# Patient Record
Sex: MALE | Race: OTHER | HISPANIC OR LATINO
[De-identification: names, ages, dates, MRNs, and addresses within clinical notes are randomized per-mention and may not be internally consistent; named-entity substitution may affect disease eponyms.]

---

## 2017-05-02 ENCOUNTER — RX ONLY (OUTPATIENT)
Age: 32
Setting detail: RX ONLY
End: 2017-05-02

## 2018-04-18 ENCOUNTER — APPOINTMENT (RX ONLY)
Dept: URBAN - METROPOLITAN AREA CLINIC 20 | Facility: CLINIC | Age: 33
Setting detail: DERMATOLOGY
End: 2018-04-18

## 2018-04-18 DIAGNOSIS — L80 VITILIGO: ICD-10-CM

## 2018-04-18 PROCEDURE — ? COUNSELING

## 2018-04-18 PROCEDURE — 99202 OFFICE O/P NEW SF 15 MIN: CPT

## 2018-04-18 PROCEDURE — ? PRESCRIPTION

## 2018-04-18 PROCEDURE — ? ORDER TESTS

## 2018-04-18 PROCEDURE — ? ADDITIONAL NOTES

## 2018-04-18 RX ORDER — TACROLIMUS 1 MG/G
OINTMENT TOPICAL BID
Qty: 1 | Refills: 3 | Status: ERX | COMMUNITY
Start: 2018-04-18

## 2018-04-18 RX ORDER — TRIAMCINOLONE ACETONIDE 1 MG/G
CREAM TOPICAL BID
Qty: 1 | Refills: 3 | Status: ERX | COMMUNITY
Start: 2018-04-18

## 2018-04-18 RX ADMIN — TACROLIMUS: 1 OINTMENT TOPICAL at 00:00

## 2018-04-18 RX ADMIN — TRIAMCINOLONE ACETONIDE: 1 CREAM TOPICAL at 00:00

## 2018-04-18 ASSESSMENT — LOCATION DETAILED DESCRIPTION DERM
LOCATION DETAILED: RIGHT INDEX DISTAL INTERPHALANGEAL JOINT
LOCATION DETAILED: RIGHT UPPER CUTANEOUS LIP
LOCATION DETAILED: LEFT INFERIOR VERMILION LIP
LOCATION DETAILED: LEFT DISTAL RADIAL DORSAL INDEX FINGER

## 2018-04-18 ASSESSMENT — LOCATION ZONE DERM
LOCATION ZONE: FINGER
LOCATION ZONE: LIP

## 2018-04-18 ASSESSMENT — LOCATION SIMPLE DESCRIPTION DERM
LOCATION SIMPLE: LEFT LIP
LOCATION SIMPLE: RIGHT INDEX FINGER
LOCATION SIMPLE: RIGHT LIP
LOCATION SIMPLE: LEFT INDEX FINGER

## 2018-04-18 NOTE — HPI: DISCOLORATION
How Severe Is Your Skin Discoloration?: mild
Additional History: Denies history of thyroid disorder, B12 deficiency, or vitiligo.

## 2018-05-08 ENCOUNTER — APPOINTMENT (RX ONLY)
Dept: URBAN - METROPOLITAN AREA CLINIC 20 | Facility: CLINIC | Age: 33
Setting detail: DERMATOLOGY
End: 2018-05-08

## 2018-05-08 DIAGNOSIS — L80 VITILIGO: ICD-10-CM

## 2018-05-08 PROBLEM — L81.9 DISORDER OF PIGMENTATION, UNSPECIFIED: Status: ACTIVE | Noted: 2018-05-08

## 2018-05-08 PROCEDURE — ? COUNSELING

## 2018-05-08 PROCEDURE — ? ADDITIONAL NOTES

## 2018-05-08 PROCEDURE — 11100: CPT

## 2018-05-08 PROCEDURE — 99213 OFFICE O/P EST LOW 20 MIN: CPT | Mod: 25

## 2018-05-08 PROCEDURE — ? PRESCRIPTION

## 2018-05-08 PROCEDURE — ? BIOPSY BY SHAVE METHOD

## 2018-05-08 RX ORDER — CLOBETASOL PROPIONATE 0.5 MG/G
CREAM TOPICAL
Qty: 1 | Refills: 0 | Status: ERX | COMMUNITY
Start: 2018-05-08

## 2018-05-08 RX ADMIN — CLOBETASOL PROPIONATE: 0.5 CREAM TOPICAL at 00:00

## 2018-05-08 ASSESSMENT — LOCATION DETAILED DESCRIPTION DERM
LOCATION DETAILED: LEFT DISTAL RADIAL DORSAL INDEX FINGER
LOCATION DETAILED: LEFT INFERIOR VERMILION LIP
LOCATION DETAILED: RIGHT INDEX DISTAL INTERPHALANGEAL JOINT
LOCATION DETAILED: LEFT MID RADIAL DORSAL INDEX FINGER
LOCATION DETAILED: RIGHT UPPER CUTANEOUS LIP

## 2018-05-08 ASSESSMENT — LOCATION SIMPLE DESCRIPTION DERM
LOCATION SIMPLE: LEFT LIP
LOCATION SIMPLE: LEFT INDEX FINGER
LOCATION SIMPLE: RIGHT INDEX FINGER
LOCATION SIMPLE: RIGHT LIP

## 2018-05-08 ASSESSMENT — LOCATION ZONE DERM
LOCATION ZONE: LIP
LOCATION ZONE: FINGER

## 2018-05-08 NOTE — PROCEDURE: BIOPSY BY SHAVE METHOD
Electrodesiccation And Curettage Text: The wound bed was treated with electrodesiccation and curettage after the biopsy was performed.
Detail Level: Detailed
Lab: [id number]
Dressing: Band-Aid
Hemostasis: Drysol and Electrocautery
Bill For Surgical Tray: no
Silver Nitrate Text: The wound bed was treated with silver nitrate after the biopsy was performed.
Wound Care: Aquaphor
Biopsy Method: Personna blade
Additional Anesthesia Volume In Cc (Will Not Render If 0): 0
Cryotherapy Text: The wound bed was treated with cryotherapy after the biopsy was performed.
Type Of Destruction Used: Curettage
Electrodesiccation Text: The wound bed was treated with electrodesiccation after the biopsy was performed.
Billing Type: Third-Party Bill
Anesthesia Type: 1% lidocaine with 1:100,000 epinephrine and a 1:6 solution of 8.4% sodium bicarbonate
Size Of Lesion In Cm: 0.3
Anesthesia Volume In Cc: 0.2
Curettage Text: The wound bed was treated with curettage after the biopsy was performed.
Notification Instructions: Patient will be notified of biopsy results. However, patient instructed to call the office if not contacted within 2 weeks.
Post-Care Instructions: I reviewed with the patient in detail post-care instructions. Patient is to keep the biopsy site dry overnight, and then apply bacitracin twice daily until healed. Patient may apply hydrogen peroxide soaks to remove any crusting.
Biopsy Type: H and E
Was A Bandage Applied: Yes
Lab Facility: 
Consent: Written consent was obtained and risks were reviewed including but not limited to scarring, infection, bleeding, scabbing, incomplete removal, nerve damage and allergy to anesthesia.

## 2018-05-08 NOTE — PROCEDURE: ADDITIONAL NOTES
Additional Notes: Start clobetasol to hypopigmented areas once a day for 30 days, if no re-pigmentation then stop\\ndiscussed  in future for around the mouth re-pigmentation \\nReviewed labs thyroid WNL\\nHowever if + for vitiligo plan to order additional tested to rule out underlying disorders.

## 2018-06-07 ENCOUNTER — APPOINTMENT (RX ONLY)
Dept: URBAN - METROPOLITAN AREA CLINIC 20 | Facility: CLINIC | Age: 33
Setting detail: DERMATOLOGY
End: 2018-06-07

## 2018-06-07 DIAGNOSIS — L80 VITILIGO: ICD-10-CM

## 2018-06-07 PROCEDURE — ? COUNSELING

## 2018-06-07 PROCEDURE — ? ADDITIONAL NOTES

## 2018-06-07 PROCEDURE — 99213 OFFICE O/P EST LOW 20 MIN: CPT

## 2018-06-07 ASSESSMENT — LOCATION DETAILED DESCRIPTION DERM
LOCATION DETAILED: RIGHT SUPERIOR VERMILION LIP
LOCATION DETAILED: RIGHT INFERIOR VERMILION LIP
LOCATION DETAILED: RIGHT UPPER CUTANEOUS LIP
LOCATION DETAILED: RIGHT INFERIOR VERMILION LIP
LOCATION DETAILED: RIGHT LOWER CUTANEOUS LIP
LOCATION DETAILED: LEFT MID RADIAL DORSAL INDEX FINGER
LOCATION DETAILED: LEFT MIDDLE DISTAL INTERPHALANGEAL JOINT
LOCATION DETAILED: RIGHT SUPERIOR VERMILION LIP

## 2018-06-07 ASSESSMENT — LOCATION ZONE DERM
LOCATION ZONE: LIP
LOCATION ZONE: LIP
LOCATION ZONE: FINGER

## 2018-06-07 ASSESSMENT — LOCATION SIMPLE DESCRIPTION DERM
LOCATION SIMPLE: LEFT MIDDLE FINGER
LOCATION SIMPLE: RIGHT LIP
LOCATION SIMPLE: RIGHT LIP
LOCATION SIMPLE: LEFT INDEX FINGER

## 2018-06-07 NOTE — PROCEDURE: ADDITIONAL NOTES
Additional Notes: Plan:\\n\\n1. Review pending labs\\n2. Refer to [facility] or [facility].\\n\\n\\nDiscussed with pt no one in [place] area w/ excimer laser, which may be beneficial.  In additions we no longer have UVB phototherapy as well. \\n\\nConsider treatment plan:\\n- oral prednisone 10-20mg qd for 3 months\\nOr\\n- IL TAC 40 mg; repeat every 4-6 weeks for max 3 injections

## 2020-11-19 ENCOUNTER — HOSPITAL ENCOUNTER (EMERGENCY)
Dept: HOSPITAL 8 - ED | Age: 35
LOS: 1 days | Discharge: HOME | End: 2020-11-20
Payer: COMMERCIAL

## 2020-11-19 VITALS — WEIGHT: 208.78 LBS | HEIGHT: 67 IN | BODY MASS INDEX: 32.77 KG/M2

## 2020-11-19 DIAGNOSIS — J18.9: Primary | ICD-10-CM

## 2020-11-19 DIAGNOSIS — R07.89: ICD-10-CM

## 2020-11-19 DIAGNOSIS — R05: ICD-10-CM

## 2020-11-19 DIAGNOSIS — R06.00: ICD-10-CM

## 2020-11-19 PROCEDURE — 71045 X-RAY EXAM CHEST 1 VIEW: CPT

## 2020-11-19 PROCEDURE — 99283 EMERGENCY DEPT VISIT LOW MDM: CPT

## 2020-11-19 PROCEDURE — 93005 ELECTROCARDIOGRAM TRACING: CPT

## 2020-11-19 NOTE — NUR
PT ATTACHED TO CARDIAC AND VS MONITORS. VSS AT THIS TIME. PT EDUCATED ON ER 
PROCESS AND VERBALIZES UNDERSTANDING. SOFÍA FLYNN, AT  FOR PT HISTORY AND 
ASSESSMENT.

## 2020-11-20 VITALS — DIASTOLIC BLOOD PRESSURE: 71 MMHG | SYSTOLIC BLOOD PRESSURE: 122 MMHG

## 2020-11-20 NOTE — NUR
PT D/C WITH D/C SUMMARY AND SCRIPTS. ALL QUESTIONS ANSWERED. PT MEDICATED PER 
MAR PRIOR TO PT D/C. PT DENIES ANY OTHER NEEDS PERTAINING TO THIS VISIT AND 
AMBULATES TO REGISTRATION DESK WITH [facility] FOR D/C HOME.

## 2022-02-02 ENCOUNTER — HOSPITAL ENCOUNTER (EMERGENCY)
Facility: MEDICAL CENTER | Age: 37
End: 2022-02-02
Attending: EMERGENCY MEDICINE
Payer: COMMERCIAL

## 2022-02-02 VITALS
SYSTOLIC BLOOD PRESSURE: 140 MMHG | OXYGEN SATURATION: 97 % | TEMPERATURE: 99.2 F | DIASTOLIC BLOOD PRESSURE: 81 MMHG | WEIGHT: 211.64 LBS | RESPIRATION RATE: 18 BRPM | BODY MASS INDEX: 32.08 KG/M2 | HEART RATE: 118 BPM | HEIGHT: 68 IN

## 2022-02-02 DIAGNOSIS — H60.391 ACUTE INFECTIVE OTITIS EXTERNA, RIGHT: ICD-10-CM

## 2022-02-02 PROCEDURE — A9270 NON-COVERED ITEM OR SERVICE: HCPCS | Performed by: EMERGENCY MEDICINE

## 2022-02-02 PROCEDURE — 700102 HCHG RX REV CODE 250 W/ 637 OVERRIDE(OP): Performed by: EMERGENCY MEDICINE

## 2022-02-02 PROCEDURE — 99283 EMERGENCY DEPT VISIT LOW MDM: CPT | Mod: EDC

## 2022-02-02 RX ORDER — NAPROXEN 500 MG/1
500 TABLET ORAL ONCE
Status: COMPLETED | OUTPATIENT
Start: 2022-02-02 | End: 2022-02-02

## 2022-02-02 RX ADMIN — NAPROXEN 500 MG: 500 TABLET ORAL at 21:42

## 2022-02-03 NOTE — ED NOTES
"[facility]hanu Clark  [facility].    Discharge instructions, which include signs and symptoms to monitor patient for, hydration and hand hygiene importance, as well as detailed information regarding acute infective otitis externa provided.  This RN also encouraged a follow-up appointment to be made with patient's PCP.All questions and concerns addressed at this time.      Prescription for neomycin provided to parent/guardian for pickup at pharmacy. Parents/guardian instructed on importance of completing full course of medication, verbalized understanding.     Discharge instructions provided to family/guardian with signed copy in chart. Patient leaves ER in no apparent distress, is awake, alert, pink, interactive and age appropriate. Family/guardian is aware of the need to return to the ER for any concerns or changes in current condition.     /81   Pulse (!) 118   Temp 37.3 °C (99.2 °F) (Temporal)   Resp 18   Ht 1.727 m (5' 8\")   Wt 96 kg (211 lb 10.3 oz)   SpO2 97% Comment: Room air  BMI 32.18 kg/m²       "

## 2022-02-03 NOTE — ED PROVIDER NOTES
ED Provider Note    ED Provider Note    Scribed for Hallie Alonso MD by Hallie Alonso M.D.. 2/2/2022, 9:41 PM.    Primary care provider: Zacarias Otero M.D.  Means of arrival: Private  History obtained from: Patient  History limited by: None    CHIEF COMPLAINT  Chief Complaint   Patient presents with   • Ear Pain     x 2 days       HPI  Bhanu Clark is a 36 y.o. male who presents to the Emergency Department for evaluation of atraumatic ear discomfort.  Patient notes symptoms for the last 2 days.  He notes initially felt what seemed like a pimple on the inside of his tragus, he notes after he slept on that side he had worsening discomfort and has since had mild purulent drainage.  No hearing loss but notes his hearing is still somewhat muffled.  Discomfort localized to the outer ear but also involves somewhat just outside the ear approximating the soft tissues overlying the right TMJ but only when he is opening closing his jaw.  No fever, no left-sided symptoms.  He notes no water exposure preceding his symptoms (no pool or hot tub use).  He otherwise healthy and is not a diabetic and not having chronic ear problems nor is he having any surgeries with regard to his ears.    REVIEW OF SYSTEMS  Pertinent positives include muffled hearing, drainage, discomfort to right ear. Pertinent negatives include no fever, no vomiting, no dyspnea, no history of diabetes.      PAST MEDICAL HISTORY   Otherwise thought to be healthy    SURGICAL HISTORY  patient denies any surgical history    SOCIAL HISTORY  Social History     Tobacco Use   • Smoking status: Never Smoker   • Smokeless tobacco: Never Used   Vaping Use   • Vaping Use: Never used   Substance Use Topics   • Alcohol use: Never   • Drug use: Never      Social History     Substance and Sexual Activity   Drug Use Never       FAMILY HISTORY  Noncontributory    CURRENT MEDICATIONS  Home Medications     Reviewed by Chloe Linares R.N. (Registered Nurse) on  "02/02/22 at 2120  Med List Status: Not Addressed   Medication Last Dose Status   azithromycin (ZITHROMAX) 250 MG Tab  Active   benzonatate (TESSALON) 100 MG Cap  Active                ALLERGIES  No Known Allergies    PHYSICAL EXAM  VITAL SIGNS: /81   Pulse (!) 118   Temp 37.3 °C (99.2 °F) (Temporal)   Resp 18   Ht 1.727 m (5' 8\")   Wt 96 kg (211 lb 10.3 oz)   SpO2 97% Comment: Room air  BMI 32.18 kg/m²     General: Alert, no acute distress  Skin: Warm, dry, normal for ethnicity  Head: Normocephalic, atraumatic  Neck: Trachea midline  Eye: normal conjunctiva  ENMT: Oral mucosa moist, no pharyngeal erythema or exudate.  Moderate induration and erythema with purulent drainage noted throughout the external auditory canal on the right.  Difficult to visualize TM but there is no evidence of loss of cone of light reflex or significant TM erythema.  No deformity no erythema no warmth nor fluctuance on exam of the auricle, very minimal induration of the anterior aspect of the tragus.  No tenderness on exam of the mastoid process, no malocclusion of the jaw, no trismus.  Cardiovascular: Regular rate and rhythm, No murmur, Normal peripheral perfusion  Respiratory: Lungs CTA, respirations are non-labored, breath sounds are equal  Musculoskeletal: No swelling, no deformity  Neurological: Alert and oriented to person, place, time, and situation  Lymphatics: No cervical lymphadenopathy  Psychiatric: Cooperative, appropriate mood & affect      COURSE & MEDICAL DECISION MAKING  Pertinent Labs & Imaging studies reviewed. (See chart for details)    9:30 PM - Patient seen and examined at bedside. Patient will be treated with Naprosyn.  The differential diagnoses include but are not limited to: Otitis externa    Patient Vitals for the past 24 hrs:   BP Temp Temp src Pulse Resp SpO2 Height Weight   02/02/22 2117 -- -- -- -- -- -- 1.727 m (5' 8\") 96 kg (211 lb 10.3 oz)   02/02/22 2114 140/81 37.3 °C (99.2 °F) Temporal (!) 118 " 18 97 % -- --     HTN/IDDM FOLLOW UP:  The patient is referred to a primary physician for blood pressure management, diabetic screening, and for all other preventive health concerns    Decision Making:  This is a 36 y.o. year old male who presents with discomfort with muffled hearing drainage to the right ear.  On exam he has evidence of significant otitis externa.  Thankfully there is no swelling or tenderness on exam of the mastoid process, no facial asymmetry, patient is afebrile and otherwise well-appearing and nontoxic.  Appropriate antibiotics will be initiated.  Patient comfortable with plan.    The patient will return for new or worsening symptoms and is stable at the time of discharge.    Patient has had high blood pressure while in the emergency department, felt likely secondary to medical condition. Counseled patient to monitor blood pressure at home and follow up with primary care physician.     DISPOSITION:  Patient will be discharged home in stable condition.    FOLLOW UP:  Zacarias Otero M.D.  [address]  [facility] [address]  [phone]    Schedule an appointment as soon as possible for a visit in 5 days        OUTPATIENT MEDICATIONS:  New Prescriptions    NEOMYCIN SULF/POLYMYX B SULF/HC SOLN (CORTISPORIN HC SOL) [id number] SOLUTION    Administer 4 Drops into the right ear 4 times a day for 7 days.         FINAL IMPRESSION  1. Acute infective otitis externa, right          Hallie TRIMBLE M.D. (Scribe), am scribing for, and in the presence of, Hallie Alonso MD.    Electronically signed by: Hallie Alonso M.D. (Diya), 2/2/2022    Hallie TRIMBLE MD personally performed the services described in this documentation, as scribed by Hallie Alonso M.D. in my presence, and it is both accurate and complete    The note accurately reflects work and decisions made by me.  Hallie Alonso M.D.  2/2/2022  9:45 PM

## 2022-02-03 NOTE — ED NOTES
First interaction with patient and Family. Reviewed and agree with triage note. Primary assessment completed. Pt awake, alert, age appropriate. Equal/unlabored respirations. Skin PWD, intact. Call light within reach. No further questions or concerns. Chart up for ERP. Will continue to assess.

## 2022-02-10 ENCOUNTER — OFFICE VISIT (OUTPATIENT)
Dept: URGENT CARE | Facility: CLINIC | Age: 37
End: 2022-02-10
Payer: COMMERCIAL

## 2022-02-10 VITALS
WEIGHT: 200 LBS | RESPIRATION RATE: 18 BRPM | HEART RATE: 111 BPM | SYSTOLIC BLOOD PRESSURE: 132 MMHG | BODY MASS INDEX: 30.31 KG/M2 | HEIGHT: 68 IN | OXYGEN SATURATION: 96 % | TEMPERATURE: 97.2 F | DIASTOLIC BLOOD PRESSURE: 88 MMHG

## 2022-02-10 DIAGNOSIS — H60.332 ACUTE SWIMMER'S EAR OF LEFT SIDE: ICD-10-CM

## 2022-02-10 PROCEDURE — 99202 OFFICE O/P NEW SF 15 MIN: CPT | Performed by: FAMILY MEDICINE

## 2022-02-10 RX ORDER — CIPROFLOXACIN AND DEXAMETHASONE 3; 1 MG/ML; MG/ML
4 SUSPENSION/ DROPS AURICULAR (OTIC) 2 TIMES DAILY
Qty: 7.5 ML | Refills: 0 | Status: SHIPPED | OUTPATIENT
Start: 2022-02-10 | End: 2022-02-17

## 2022-02-10 NOTE — PROGRESS NOTES
"Subjective     Bhanu Clark is a 36 y.o. male who presents with Otalgia (x2days, left ear, pain around ear as well )    - This is a pleasant and nontoxic appearing 36 y.o. male who has come to the walk-in clinic today for:    #1) Atraumatic Lt ear pain x 2 days. No NVFC, no dc/bleeeding from ear       ALLERGIES:  Patient has no known allergies.     PMH:  History reviewed. No pertinent past medical history.     PSH:  History reviewed. No pertinent surgical history.    MEDS:    Current Outpatient Medications:   •  ciprofloxacin/dexamethasone (CIPRODEX) 0.3-0.1 % Suspension, Administer 4 Drops into the left ear 2 times a day for 7 days., Disp: 7.5 mL, Rfl: 0    ** I have documented what I find to be significant in regards to past medical, social, family and surgical history  in my HPI or under PMH/PSH/FH review section, otherwise it is noncontributory **             HPI    Review of Systems   HENT: Positive for ear pain.    All other systems reviewed and are negative.             Objective     /88   Pulse (!) 111   Temp 36.2 °C (97.2 °F) (Temporal)   Resp 18   Ht 1.727 m (5' 8\")   Wt 90.7 kg (200 lb)   SpO2 96%   BMI 30.41 kg/m²      Physical Exam  Vitals and nursing note reviewed.   Constitutional:       General: He is not in acute distress.     Appearance: Normal appearance. He is well-developed.   HENT:      Head: Normocephalic and atraumatic.      Ears:      Comments: Lt ear: tender tragus, EAC w/ a little edema/erythema, TM WNL  Eyes:      General: No scleral icterus.  Cardiovascular:      Heart sounds: Normal heart sounds. No murmur heard.      Pulmonary:      Effort: Pulmonary effort is normal. No respiratory distress.      Breath sounds: Normal breath sounds.   Skin:     Coloration: Skin is not jaundiced or pale.   Neurological:      Mental Status: He is alert.      Motor: No abnormal muscle tone.   Psychiatric:         Mood and Affect: Mood normal.         Behavior: Behavior normal. "           Assessment & Plan       1. Acute swimmer's ear of left side  ciprofloxacin/dexamethasone (CIPRODEX) 0.3-0.1 % Suspension       - Dx, plan & d/c instructions discussed   - Rest, stay hydrated, OTC Motrin and/or Tylenol as needed  - E.R. precautions discussed     Asked to kindly follow up with their PCP's office in 3-5 days for a recheck, ER if not improving or feeling/getting worse.    Any realistic side effects of medications that may have been given today reviewed.     Patient left in stable condition
